# Patient Record
Sex: MALE | Race: WHITE | NOT HISPANIC OR LATINO | Employment: FULL TIME | URBAN - METROPOLITAN AREA
[De-identification: names, ages, dates, MRNs, and addresses within clinical notes are randomized per-mention and may not be internally consistent; named-entity substitution may affect disease eponyms.]

---

## 2021-03-31 ENCOUNTER — HOSPITAL ENCOUNTER (EMERGENCY)
Facility: HOSPITAL | Age: 46
Discharge: HOME | End: 2021-03-31
Attending: EMERGENCY MEDICINE | Admitting: EMERGENCY MEDICINE
Payer: COMMERCIAL

## 2021-03-31 ENCOUNTER — APPOINTMENT (EMERGENCY)
Dept: ULTRASOUND IMAGING | Facility: HOSPITAL | Age: 46
End: 2021-03-31
Payer: COMMERCIAL

## 2021-03-31 VITALS
HEIGHT: 71 IN | DIASTOLIC BLOOD PRESSURE: 71 MMHG | SYSTOLIC BLOOD PRESSURE: 153 MMHG | BODY MASS INDEX: 35.7 KG/M2 | HEART RATE: 85 BPM | RESPIRATION RATE: 18 BRPM | OXYGEN SATURATION: 98 % | WEIGHT: 255 LBS | TEMPERATURE: 98 F

## 2021-03-31 DIAGNOSIS — M25.561 PAIN AND SWELLING OF KNEE, RIGHT: ICD-10-CM

## 2021-03-31 DIAGNOSIS — M25.461 PAIN AND SWELLING OF KNEE, RIGHT: ICD-10-CM

## 2021-03-31 DIAGNOSIS — M79.89 RIGHT LEG SWELLING: Primary | ICD-10-CM

## 2021-03-31 LAB
ALBUMIN SERPL BCP-MCNC: 3.9 G/DL (ref 3.5–5)
ALP SERPL-CCNC: 75 U/L (ref 46–116)
ALT SERPL W P-5'-P-CCNC: 50 U/L (ref 12–78)
ANION GAP SERPL CALCULATED.3IONS-SCNC: 9 MMOL/L (ref 4–13)
APTT PPP: 27 SECONDS (ref 23–37)
AST SERPL W P-5'-P-CCNC: 20 U/L (ref 5–45)
BASOPHILS # BLD AUTO: 0.02 THOUSANDS/ΜL (ref 0–0.1)
BASOPHILS NFR BLD AUTO: 0 % (ref 0–1)
BILIRUB SERPL-MCNC: 0.65 MG/DL (ref 0.2–1)
BUN SERPL-MCNC: 15 MG/DL (ref 5–25)
CALCIUM SERPL-MCNC: 8.8 MG/DL (ref 8.3–10.1)
CHLORIDE SERPL-SCNC: 103 MMOL/L (ref 100–108)
CO2 SERPL-SCNC: 27 MMOL/L (ref 21–32)
CREAT SERPL-MCNC: 1 MG/DL (ref 0.6–1.3)
EOSINOPHIL # BLD AUTO: 0.13 THOUSAND/ΜL (ref 0–0.61)
EOSINOPHIL NFR BLD AUTO: 2 % (ref 0–6)
ERYTHROCYTE [DISTWIDTH] IN BLOOD BY AUTOMATED COUNT: 12.7 % (ref 11.6–15.1)
GFR SERPL CREATININE-BSD FRML MDRD: 90 ML/MIN/1.73SQ M
GLUCOSE SERPL-MCNC: 100 MG/DL (ref 65–140)
HCT VFR BLD AUTO: 44.2 % (ref 36.5–49.3)
HGB BLD-MCNC: 15 G/DL (ref 12–17)
IMM GRANULOCYTES # BLD AUTO: 0.02 THOUSAND/UL (ref 0–0.2)
IMM GRANULOCYTES NFR BLD AUTO: 0 % (ref 0–2)
INR PPP: 0.94 (ref 0.84–1.19)
LYMPHOCYTES # BLD AUTO: 2.43 THOUSANDS/ΜL (ref 0.6–4.47)
LYMPHOCYTES NFR BLD AUTO: 34 % (ref 14–44)
MCH RBC QN AUTO: 27.8 PG (ref 26.8–34.3)
MCHC RBC AUTO-ENTMCNC: 33.9 G/DL (ref 31.4–37.4)
MCV RBC AUTO: 82 FL (ref 82–98)
MONOCYTES # BLD AUTO: 0.56 THOUSAND/ΜL (ref 0.17–1.22)
MONOCYTES NFR BLD AUTO: 8 % (ref 4–12)
NEUTROPHILS # BLD AUTO: 3.9 THOUSANDS/ΜL (ref 1.85–7.62)
NEUTS SEG NFR BLD AUTO: 56 % (ref 43–75)
NRBC BLD AUTO-RTO: 0 /100 WBCS
PLATELET # BLD AUTO: 247 THOUSANDS/UL (ref 149–390)
PMV BLD AUTO: 9 FL (ref 8.9–12.7)
POTASSIUM SERPL-SCNC: 4.1 MMOL/L (ref 3.5–5.3)
PROT SERPL-MCNC: 7.3 G/DL (ref 6.4–8.2)
PROTHROMBIN TIME: 12.8 SECONDS (ref 11.6–14.5)
RBC # BLD AUTO: 5.4 MILLION/UL (ref 3.88–5.62)
SODIUM SERPL-SCNC: 139 MMOL/L (ref 136–145)
WBC # BLD AUTO: 7.06 THOUSAND/UL (ref 4.31–10.16)

## 2021-03-31 PROCEDURE — 99284 EMERGENCY DEPT VISIT MOD MDM: CPT | Performed by: EMERGENCY MEDICINE

## 2021-03-31 PROCEDURE — 80053 COMPREHEN METABOLIC PANEL: CPT | Performed by: EMERGENCY MEDICINE

## 2021-03-31 PROCEDURE — 93971 EXTREMITY STUDY: CPT

## 2021-03-31 PROCEDURE — 99284 EMERGENCY DEPT VISIT MOD MDM: CPT

## 2021-03-31 PROCEDURE — 36415 COLL VENOUS BLD VENIPUNCTURE: CPT | Performed by: EMERGENCY MEDICINE

## 2021-03-31 PROCEDURE — 85610 PROTHROMBIN TIME: CPT | Performed by: EMERGENCY MEDICINE

## 2021-03-31 PROCEDURE — 85730 THROMBOPLASTIN TIME PARTIAL: CPT | Performed by: EMERGENCY MEDICINE

## 2021-03-31 PROCEDURE — 85025 COMPLETE CBC W/AUTO DIFF WBC: CPT | Performed by: EMERGENCY MEDICINE

## 2021-03-31 NOTE — ED PROVIDER NOTES
Pt Name: Kylah Hidalgo  MRN: [id number]  [place] 1975  Age/Sex: 39 y o  male  Date of evaluation: 3/31/2021  PCP: No primary care provider on file  CHIEF COMPLAINT    Chief Complaint   Patient presents with    Leg Pain     R sided leg pain x1 week after, new onset knee swelling, pt reports hitting leg off of something, hx of DVT         HPI    39 y o  male presenting with right-sided leg pain and swelling  Patient states that he struck his leg about a week or week and half ago, had swelling in the knee as well as in the leg below it  He states the pain dull, mild to moderate, in the right lower extremity, worse with pressing on the area or walking and better at rest   He notes a history of a DVT 3 years ago that was thought to be provoked by travel, states he is no longer anticoagulated in that his current symptoms feel like his last DVT  He states he went to an urgent care and sent this ER for an ultrasound to rule out DVT  He denies shortness of breath, cough, fevers, other symptoms  HPI      Past Medical and Surgical History    Past Medical History:   Diagnosis Date    DVT (deep venous thrombosis) ([facility] [address] )        History reviewed  No pertinent surgical history  History reviewed  No pertinent family history  Social History     Tobacco Use    Smoking status: Never Smoker    Smokeless tobacco: Never Used   Substance Use Topics    Alcohol use: Never     Frequency: Never    Drug use: Not Currently           Allergies    No Known Allergies    Home Medications    Prior to Admission medications    Not on File           Review of Systems    Review of Systems   Constitutional: Negative for appetite change, chills and diaphoresis  HENT: Negative for drooling, facial swelling, trouble swallowing and voice change  Respiratory: Negative for apnea, shortness of breath and wheezing  Cardiovascular: Positive for leg swelling  Negative for chest pain     Gastrointestinal: Negative for abdominal distention, abdominal pain, diarrhea, nausea and vomiting  Genitourinary: Negative for dysuria and urgency  Musculoskeletal: Negative for arthralgias, back pain, gait problem and neck pain  Skin: Negative for color change, rash and wound  Neurological: Negative for seizures, speech difficulty, weakness and headaches  Psychiatric/Behavioral: Negative for agitation, behavioral problems and dysphoric mood  The patient is not nervous/anxious  All other systems reviewed and negative  Physical Exam      ED Triage Vitals [03/31/21 1514]   Temperature Pulse Respirations Blood Pressure SpO2   98 °F (36 7 °C) 85 18 153/71 98 %      Temp Source Heart Rate Source Patient Position - Orthostatic VS BP Location FiO2 (%)   Oral Monitor Sitting Left arm --      Pain Score       --               Physical Exam  Vitals signs and nursing note reviewed  Constitutional:       Appearance: He is well-developed  HENT:      Head: Normocephalic and atraumatic  Eyes:      Conjunctiva/sclera: Conjunctivae normal       Pupils: Pupils are equal, round, and reactive to light  Neck:      Musculoskeletal: Normal range of motion and neck supple  Trachea: No tracheal deviation  Cardiovascular:      Rate and Rhythm: Normal rate and regular rhythm  Heart sounds: Normal heart sounds  No murmur  Pulmonary:      Effort: Pulmonary effort is normal  No respiratory distress  Breath sounds: Normal breath sounds  No stridor  No wheezing or rales  Abdominal:      General: There is no distension  Palpations: Abdomen is soft  Tenderness: There is no abdominal tenderness  There is no guarding or rebound  Musculoskeletal: Normal range of motion  General: Swelling present  No deformity        Comments: Mild effusion of the right knee with slightly fluctuant area in the posterior aspect, right lower leg also swollen with mild 1+ pitting edema, larger in circumference than the left lower leg   Strength sensation pulse and cap refill intact distal    Skin:     General: Skin is warm and dry  Findings: No rash  Neurological:      Mental Status: He is alert and oriented to person, place, and time  Psychiatric:         Behavior: Behavior normal          Thought Content:  Thought content normal          Judgment: Judgment normal               Diagnostic Results      Labs:    Results Reviewed     Procedure Component Value Units Date/Time    Comprehensive metabolic panel [[id number]] Collected: 03/31/21 1618    Lab Status: Final result Specimen: Blood from Arm, Right Updated: 03/31/21 1642     Sodium 139 mmol/L      Potassium 4 1 mmol/L      Chloride 103 mmol/L      CO2 27 mmol/L      ANION GAP 9 mmol/L      BUN 15 mg/dL      Creatinine 1 00 mg/dL      Glucose 100 mg/dL      Calcium 8 8 mg/dL      AST 20 U/L      ALT 50 U/L      Alkaline Phosphatase 75 U/L      Total Protein 7 3 g/dL      Albumin 3 9 g/dL      Total Bilirubin 0 65 mg/dL      eGFR 90 ml/min/1 73sq m     Narrative:      [place] guidelines for Chronic Kidney Disease (CKD):     Stage 1 with normal or high GFR (GFR > 90 mL/min/1 73 square meters)    Stage 2 Mild CKD (GFR = 60-89 mL/min/1 73 square meters)    Stage 3A Moderate CKD (GFR = 45-59 mL/min/1 73 square meters)    Stage 3B Moderate CKD (GFR = 30-44 mL/min/1 73 square meters)    Stage 4 Severe CKD (GFR = 15-29 mL/min/1 73 square meters)    Stage 5 End Stage CKD (GFR <15 mL/min/1 73 square meters)  Note: GFR calculation is accurate only with a steady state creatinine    Protime-INR [[id number]]  (Normal) Collected: 03/31/21 1618    Lab Status: Final result Specimen: Blood from Arm, Right Updated: 03/31/21 1637     Protime 12 8 seconds      INR 0 94    APTT [[id number]]  (Normal) Collected: 03/31/21 1618    Lab Status: Final result Specimen: Blood from Arm, Right Updated: 03/31/21 1637     PTT 27 seconds     CBC and differential [[id number]] Collected: 03/31/21 1618    Lab Status: Final result Specimen: Blood from Arm, Right Updated: 03/31/21 1627     WBC 7 06 Thousand/uL      RBC 5 40 Million/uL      Hemoglobin 15 0 g/dL      Hematocrit 44 2 %      MCV 82 fL      MCH 27 8 pg      MCHC 33 9 g/dL      RDW 12 7 %      MPV 9 0 fL      Platelets 557 Thousands/uL      nRBC 0 /100 WBCs      Neutrophils Relative 56 %      Immat GRANS % 0 %      Lymphocytes Relative 34 %      Monocytes Relative 8 %      Eosinophils Relative 2 %      Basophils Relative 0 %      Neutrophils Absolute 3 90 Thousands/µL      Immature Grans Absolute 0 02 Thousand/uL      Lymphocytes Absolute 2 43 Thousands/µL      Monocytes Absolute 0 56 Thousand/µL      Eosinophils Absolute 0 13 Thousand/µL      Basophils Absolute 0 02 Thousands/µL           All labs reviewed and utilized in the medical decision making process    Radiology:    VAS lower limb venous duplex study, unilateral/limited    (Results Pending)       All radiology studies independently viewed by me and interpreted by the radiologist     Procedure    Procedures        ED Course of Care and Re-Assessments      Labs reassuring, no DVT on ultrasound although fluid collection noted posterior to the knee  Medications - No data to display        FINAL IMPRESSION    Final diagnoses:   Right leg swelling   Pain and swelling of knee, right         DISPOSITION/PLAN    Right leg swelling and pain as above  Vital signs reassuring with mild hypertension noted but felt to be noncontributory, examination remarkable for swelling and pain of the right leg as above  Ultrasound negative for DVT, fluid collection noted, patient counseled regarding same  Labs likewise reassuring  Discharged strict return precautions, follow up Orthopedics    Time reflects when diagnosis was documented in both MDM as applicable and the Disposition within this note     Time User Action Codes Description Comment    3/31/2021  4:56 PM Phineas Prime Add [M79 89] Right leg swelling     3/31/2021  4:59 PM Aidan Butcher Add [B09 586,  M25 461] Pain and swelling of knee, right       ED Disposition     ED Disposition Condition Date/Time Comment    Discharge Stable Wed Mar 31, 2021  4:56 PM Jordan Payer discharge to home/self care  Follow-up Information     Follow up With Specialties Details Why Contact Info Additional 2000 [facility] Emergency Department Emergency Medicine Go to  If symptoms worsen 34 [facility] 109 [facility] Emergency Department, [address] [place], [place], 201 [facility] Orthopedic Surgery Call in 1 day To schedule close follow-up for your symptoms 819 [facility] 09 [phone] [address] [place] [address], [address] [address] [place], [place], [phone]            PATIENT REFERRED TO:    [facility]4 [facility] Emergency Department  34 [facility] [address]  [phone]  Go to   If symptoms worsen    [address] [facility]  [address] [facility] 68 [phone]  Call in 1 day  To schedule close follow-up for your symptoms      DISCHARGE MEDICATIONS:    There are no discharge medications for this patient  No discharge procedures on file           MD Shannon Kruger MD  03/31/21 3853

## 2021-04-01 PROCEDURE — 93971 EXTREMITY STUDY: CPT | Performed by: SURGERY

## 2021-04-05 ENCOUNTER — VBI (OUTPATIENT)
Dept: ADMINISTRATIVE | Facility: OTHER | Age: 46
End: 2021-04-05

## 2023-11-04 ENCOUNTER — APPOINTMENT (EMERGENCY)
Dept: RADIOLOGY | Facility: HOSPITAL | Age: 48
End: 2023-11-04
Payer: OTHER MISCELLANEOUS

## 2023-11-04 ENCOUNTER — HOSPITAL ENCOUNTER (EMERGENCY)
Facility: HOSPITAL | Age: 48
Discharge: HOME | End: 2023-11-04
Attending: STUDENT IN AN ORGANIZED HEALTH CARE EDUCATION/TRAINING PROGRAM
Payer: OTHER MISCELLANEOUS

## 2023-11-04 VITALS
RESPIRATION RATE: 16 BRPM | OXYGEN SATURATION: 98 % | HEART RATE: 85 BPM | DIASTOLIC BLOOD PRESSURE: 80 MMHG | SYSTOLIC BLOOD PRESSURE: 135 MMHG | TEMPERATURE: 98.1 F

## 2023-11-04 DIAGNOSIS — S69.90XA FINGER INJURY, INITIAL ENCOUNTER: Primary | ICD-10-CM

## 2023-11-04 DIAGNOSIS — M79.646 FINGER PAIN: ICD-10-CM

## 2023-11-04 PROCEDURE — 90715 TDAP VACCINE 7 YRS/> IM: CPT | Performed by: STUDENT IN AN ORGANIZED HEALTH CARE EDUCATION/TRAINING PROGRAM

## 2023-11-04 PROCEDURE — 64450 NJX AA&/STRD OTHER PN/BRANCH: CPT | Performed by: STUDENT IN AN ORGANIZED HEALTH CARE EDUCATION/TRAINING PROGRAM

## 2023-11-04 PROCEDURE — 90471 IMMUNIZATION ADMIN: CPT

## 2023-11-04 PROCEDURE — 99283 EMERGENCY DEPT VISIT LOW MDM: CPT

## 2023-11-04 PROCEDURE — 99284 EMERGENCY DEPT VISIT MOD MDM: CPT | Performed by: STUDENT IN AN ORGANIZED HEALTH CARE EDUCATION/TRAINING PROGRAM

## 2023-11-04 PROCEDURE — 73130 X-RAY EXAM OF HAND: CPT

## 2023-11-04 RX ORDER — CEPHALEXIN 250 MG/1
500 CAPSULE ORAL ONCE
Status: COMPLETED | OUTPATIENT
Start: 2023-11-04 | End: 2023-11-04

## 2023-11-04 RX ORDER — HYDROCODONE BITARTRATE AND ACETAMINOPHEN 5; 325 MG/1; MG/1
1 TABLET ORAL ONCE
Status: COMPLETED | OUTPATIENT
Start: 2023-11-04 | End: 2023-11-04

## 2023-11-04 RX ORDER — CEPHALEXIN 500 MG/1
500 CAPSULE ORAL EVERY 6 HOURS SCHEDULED
Qty: 28 CAPSULE | Refills: 0 | Status: SHIPPED | OUTPATIENT
Start: 2023-11-04 | End: 2023-11-11

## 2023-11-04 RX ORDER — LIDOCAINE HYDROCHLORIDE 10 MG/ML
10 INJECTION, SOLUTION EPIDURAL; INFILTRATION; INTRACAUDAL; PERINEURAL ONCE
Status: COMPLETED | OUTPATIENT
Start: 2023-11-04 | End: 2023-11-04

## 2023-11-04 RX ADMIN — CEPHALEXIN 500 MG: 250 CAPSULE ORAL at 14:54

## 2023-11-04 RX ADMIN — LIDOCAINE HYDROCHLORIDE 10 ML: 10 INJECTION, SOLUTION EPIDURAL; INFILTRATION; INTRACAUDAL at 12:26

## 2023-11-04 RX ADMIN — TETANUS TOXOID, REDUCED DIPHTHERIA TOXOID AND ACELLULAR PERTUSSIS VACCINE, ADSORBED 0.5 ML: 5; 2.5; 8; 8; 2.5 SUSPENSION INTRAMUSCULAR at 12:25

## 2023-11-04 RX ADMIN — HYDROCODONE BITARTRATE AND ACETAMINOPHEN 1 TABLET: 5; 325 TABLET ORAL at 12:25

## 2023-11-04 NOTE — ED PROVIDER NOTES
History  Chief Complaint   Patient presents with    Hand Injury     Pt was using a  at work approx 1 hrs ago and cut his right middle and ring finger. Pt states fingernails are removed from his hand     HPI    Patient is a 28-year-old male present emerged department after hurting himself at work. Patient was using a  about 1 hour ago when he cut his right middle and ring fingers. Patient's said that his fingernails were removed right at that time. Does not note any other additional injuries. Point tenderness to the distal tips. Patient has not tried any over-the-counter medication for discomfort. Patient does not have any other pertinent past medical history is not on any blood thinners. Uncertain last tetanus shot. History otherwise negative. None       Past Medical History:   Diagnosis Date    DVT (deep venous thrombosis) ([address])        History reviewed. No pertinent surgical history. History reviewed. No pertinent family history. I have reviewed and agree with the history as documented. E-Cigarette/Vaping     E-Cigarette/Vaping Substances     Social History     Tobacco Use    Smoking status: Never    Smokeless tobacco: Never   Substance Use Topics    Alcohol use: Never    Drug use: Not Currently       Review of Systems   Constitutional:  Negative for chills and fever. HENT:  Negative for ear pain and sore throat. Eyes:  Negative for pain and visual disturbance. Respiratory:  Negative for cough and shortness of breath. Cardiovascular:  Negative for chest pain and palpitations. Gastrointestinal:  Negative for abdominal pain and vomiting. Genitourinary:  Negative for dysuria and hematuria. Musculoskeletal:  Negative for arthralgias and back pain. Right hand 3rd and 4th digits, pain with cut   Skin:  Negative for color change and rash. Neurological:  Negative for seizures and syncope. All other systems reviewed and are negative.       Physical Exam  Physical Exam  Vitals and nursing note reviewed. Constitutional:       General: He is not in acute distress. Appearance: He is well-developed. HENT:      Head: Normocephalic and atraumatic. Eyes:      Conjunctiva/sclera: Conjunctivae normal.   Cardiovascular:      Rate and Rhythm: Normal rate. Heart sounds: No murmur heard. Pulmonary:      Effort: Pulmonary effort is normal. No respiratory distress. Breath sounds: Normal breath sounds. Abdominal:      Palpations: Abdomen is soft. Tenderness: There is no abdominal tenderness. Musculoskeletal:         General: No swelling. Cervical back: Neck supple. Comments: Right hand digits 3rd and 4th partial nail removal   Skin:     General: Skin is warm and dry. Capillary Refill: Capillary refill takes less than 2 seconds. Comments: Wound right hand fingers 3 and 4. Missing part of nails, cut off   Neurological:      General: No focal deficit present. Mental Status: He is alert and oriented to person, place, and time.    Psychiatric:         Mood and Affect: Mood normal.         Vital Signs  ED Triage Vitals   Temperature Pulse Respirations Blood Pressure SpO2   11/04/23 1154 11/04/23 1154 11/04/23 1154 11/04/23 1154 11/04/23 1154   98.1 °F (36.7 °C) 88 18 138/83 97 %      Temp Source Heart Rate Source Patient Position - Orthostatic VS BP Location FiO2 (%)   11/04/23 1154 11/04/23 1154 11/04/23 1154 11/04/23 1154 --   Oral Monitor Sitting Left arm       Pain Score       11/04/23 1225       2           Vitals:    11/04/23 1154 11/04/23 1353   BP: 138/83 135/80   Pulse: 88 85   Patient Position - Orthostatic VS: Sitting Sitting         Visual Acuity      ED Medications  Medications   HYDROcodone-acetaminophen (NORCO) 5-325 mg per tablet 1 tablet (1 tablet Oral Given 11/4/23 1225)   tetanus-diphtheria-acellular pertussis (BOOSTRIX) IM injection 0.5 mL (0.5 mL Intramuscular Given 11/4/23 1225)   lidocaine (PF) (XYLOCAINE-MPF) 1 % injection 10 mL (10 mL Infiltration Given 11/4/23 1226)   cephalexin (KEFLEX) capsule 500 mg (500 mg Oral Given 11/4/23 3603)       Diagnostic Studies  Results Reviewed       None                   XR hand 3+ views RIGHT   Final Result by Terrell Parker MD (11/05 1336)      No acute osseous abnormality. Degenerative changes as described. Resident: Irma Omer, the attending radiologist, have reviewed the images and agree with the final report above. Workstation performed: [id number]                    Procedures  Procedures         ED Course                               SBIRT 22yo+      Flowsheet Row Most Recent Value   Initial Alcohol Screen: US AUDIT-C     1. How often do you have a drink containing alcohol? 0 Filed at: 11/04/2023 1205   2. How many drinks containing alcohol do you have on a typical day you are drinking? 0 Filed at: 11/04/2023 1205   3a. Male UNDER 65: How often do you have five or more drinks on one occasion? 0 Filed at: 11/04/2023 1205   Audit-C Score 0 Filed at: 11/04/2023 1205   JAQUELIN: How many times in the past year have you. .. Used an illegal drug or used a prescription medication for non-medical reasons? Never Filed at: 11/04/2023 1205                      Medical Decision Making  Differential: phalanx fx, nail removal, open fx    Patient is a well-appearing 55-year-old male presenting emerged department no acute respiratory distress and vital signs overall unremarkable. On physical exam patient has been part of the nails on his right hand digits 3 and 4. Right hand is neurovascularly intact. Strength, sensation and range of motion intact in all digits with flexion and extension. Patient was provided with materials to help with hemostasis. X-ray performed shows no acute osseous abnormality.     Had discussion with the on-call Ortho provider Keflex and agreeable to plan of Surgicel and follow-up for possible wound changes and a dressing. Digital block was performed in digits 3 and 4.  4 cc of lidocaine was used. Patient wound was thoroughly cleansed with 500 cc of sterile water. Surgicel was placed on top of the wound and wound was bandaged. Patient tolerated this at bedside. Hemostasis achieved. Discussed plans of antibiotics and follow-up with Ortho. Patient verbalized understanding for symptomatic management as well as return follow-up precautions. Disposition discharge    Amount and/or Complexity of Data Reviewed  Radiology: ordered and independent interpretation performed. Risk  Prescription drug management. Disposition  Final diagnoses:   Finger injury, initial encounter   Finger pain     Time reflects when diagnosis was documented in both MDM as applicable and the Disposition within this note       Time User Action Codes Description Comment    11/4/2023  1:58 PM Florence Lerma Finger injury, initial encounter     11/4/2023  1:58 PM Hank Sanderson Add [N36.374] Finger pain           ED Disposition       ED Disposition   Discharge    Condition   Stable    Date/Time   Sat Nov 4, 2023 [address] discharge to home/self care.                    Follow-up Information       Follow up With Specialties Details Why Contact Info Additional [address], MD Orthopedic Surgery, Hand Surgery   [address], 300 [facility] 1320 [facility]       201 [facility] Orthopedic Surgery Schedule an appointment as soon as possible for a visit  For wound re-check Swapna ROBLES [address] [place] [address] [address] [id number]  [address], [address] [place] [address], [address] [place] [address]            Discharge Medication List as of 11/4/2023  2:33 PM        START taking these medications    Details cephalexin (KEFLEX) 500 mg capsule Take 1 capsule (500 mg total) by mouth every 6 (six) hours for 7 days, Starting Sat 11/4/2023, Until Sat 11/11/2023, Normal             No discharge procedures on file.     PDMP Review       None            ED Provider  Electronically Signed by             Amalia Hastings DO  11/12/23 0664

## 2023-11-04 NOTE — DISCHARGE INSTRUCTIONS
Please take the antibiotic as prescribed. Continue to move your fingers to prevent stiffness. Follow-up with orthopedics next week so dressing changes can be discussed. Follow-up with orthopedics. Return if you have new or worsening symptoms such as increasing discomfort, persistent bleeding.

## 2023-11-06 ENCOUNTER — TELEPHONE (OUTPATIENT)
Age: 48
End: 2023-11-06

## 2023-11-06 NOTE — TELEPHONE ENCOUNTER
Hello,  Please advise if the following patient can be forced onto the schedule:    Patient: Marysol Edwards. Zeno Kayser     : [birth year]     MRN: [id number]     Call back #: [phone]     Insurance: W/C  Reason for appointment: Recent ER visit     Requested doctor/location: [facility]       Thank you.

## 2023-11-10 VITALS — WEIGHT: 255 LBS | HEIGHT: 71 IN | BODY MASS INDEX: 35.7 KG/M2

## 2023-11-10 DIAGNOSIS — S61.312A LACERATION OF RIGHT MIDDLE FINGER WITHOUT FOREIGN BODY WITH DAMAGE TO NAIL, INITIAL ENCOUNTER: Primary | ICD-10-CM

## 2023-11-10 DIAGNOSIS — S61.314A LACERATION OF RIGHT RING FINGER WITHOUT FOREIGN BODY WITH DAMAGE TO NAIL, INITIAL ENCOUNTER: ICD-10-CM

## 2023-11-10 PROCEDURE — 99203 OFFICE O/P NEW LOW 30 MIN: CPT | Performed by: STUDENT IN AN ORGANIZED HEALTH CARE EDUCATION/TRAINING PROGRAM

## 2023-11-10 NOTE — PROGRESS NOTES
ORTHOPAEDIC HAND, WRIST, AND ELBOW OFFICE  VISIT       ASSESSMENT/PLAN:      Diagnoses and all orders for this visit:    Laceration of right middle finger without foreign body with damage to nail, initial encounter    Laceration of right ring finger without foreign body with damage to nail, initial encounter          50 y.o. male with lacerations to the nailbed of right middle and ring fingers, DOI: 11/04/2023. Treatment options and expected outcomes were discussed. The patient verbalized understanding of exam findings and treatment plan. The patient was given the opportunity to ask questions. Questions were answered to the patient's satisfaction. Discussed that he is at risk of nail deformity with either surgical or nonsurgical management. Discussion of nail anatomy was discussed. Patient was advised that surgical intervention may be done if there is significant damage to the nailbed. However, it is likely that lacerations can heal with conservative management. The patient decided to move forward with conservative management of lacerations via shared decision making. Patient was advised he may cover the lacerations when active with a bandaid. Otherwise, patient may leave digits open to the air. Patient may also wear finger splints at nighttime when sleeping to prevent injury to the nailbeds. Follow Up:  2 weeks       To Do Next Visit:  Re-evaluation of current issue      Discussions: The anatomy and physiology of the diagnosis(es) was(were) discussed with the patient today in the office. Treatment options and recommendations were discussed, as well as expected future outcomes.        Maura Sage MD  Attending, Orthopaedic Surgery  Hand, Wrist, and Elbow Surgery  [address] [place] [address]    ____________________________________________________________________________________________________________________________________________      CHIEF COMPLAINT:  Chief Complaint   Patient presents with   • Right Ring Finger - Pain   • Right Middle Finger - Pain       SUBJECTIVE:  Patient is a 50 y.o. RHD male who presents today for evaluation and treatment of right middle and ring finger lacerations. Patient was seen in the ED on 11/04/2023 after patient was using a  and sliced the ring and middle fingers. Dressings were placed on the fingers in the ED. Patient is a  and has been working since the injury. Patient denies any numbness or tingling in the right hand. Patient mentions he has cerebral palsy and has weakness in hands at baseline. I have personally reviewed all the relevant PMH, PSH, SH, FH, Medications and allergies      PAST MEDICAL HISTORY:  Past Medical History:   Diagnosis Date   • DVT (deep venous thrombosis) ([address])        PAST SURGICAL HISTORY:  History reviewed. No pertinent surgical history. FAMILY HISTORY:  History reviewed. No pertinent family history. SOCIAL HISTORY:  Social History     Tobacco Use   • Smoking status: Never   • Smokeless tobacco: Never   Substance Use Topics   • Alcohol use: Never   • Drug use: Not Currently       MEDICATIONS:    Current Outpatient Medications:   •  cephalexin (KEFLEX) 500 mg capsule, Take 1 capsule (500 mg total) by mouth every 6 (six) hours for 7 days, Disp: 28 capsule, Rfl: 0    ALLERGIES:  No Known Allergies        REVIEW OF SYSTEMS:  Musculoskeletal:        As noted in HPI. All other systems reviewed and are negative. VITALS:  There were no vitals filed for this visit.     LABS:  HgA1c: No results found for: "HGBA1C"  BMP:   Lab Results   Component Value Date    CALCIUM 8.8 03/31/2021    K 4.1 03/31/2021    CO2 27 03/31/2021     03/31/2021    BUN 15 03/31/2021    CREATININE 1.00 03/31/2021       _____________________________________________________  PHYSICAL EXAMINATION:  General: Well developed and well nourished, alert & oriented x 3, appears comfortable  Psychiatric: Normal  HEENT: Normocephalic, Atraumatic Trachea Midline, No torticollis  Pulmonary: No audible wheezing or respiratory distress   Abdomen/GI: Non tender, non distended   Cardiovascular: No pitting edema, 2+ radial pulse   Skin: No masses, erythema, lacerations, fluctation, ulcerations, Laceration to the nailbed of middle and ring fingers  Neurovascular: Sensation Intact to the Median, Ulnar, Radial Nerve, Motor Intact to the Median, Ulnar, Radial Nerve, and Pulses Intact  Musculoskeletal: Normal, except as noted in detailed exam and in HPI. MUSCULOSKELETAL EXAMINATION:  Right hand:   Full extension to the digits  Flexor and extensor tendons intact  Nail bed injuries to the nailbeds of third and fourth digits  2+ radial pulse  Cap refill < 2 sec      ___________________________________________________  STUDIES REVIEWED:  Xrays of the right hand were reviewed and independently interpreted in PACS by Dr. Erin Rodriguez and demonstrate no acute fractures or dislocations.            PROCEDURES PERFORMED:  Procedures  No Procedures performed today    _____________________________________________________      Scribe Attestation    I,:  Carson Navarro PA-C am acting as a scribe while in the presence of the attending physician.:       I,:  Dominga Waters MD personally performed the services described in this documentation    as scribed in my presence.: